# Patient Record
Sex: MALE | Race: OTHER | NOT HISPANIC OR LATINO | URBAN - METROPOLITAN AREA
[De-identification: names, ages, dates, MRNs, and addresses within clinical notes are randomized per-mention and may not be internally consistent; named-entity substitution may affect disease eponyms.]

---

## 2017-09-18 ENCOUNTER — EMERGENCY (EMERGENCY)
Facility: HOSPITAL | Age: 52
LOS: 1 days | Discharge: HOME | End: 2017-09-18
Admitting: EMERGENCY MEDICINE
Payer: MEDICAID

## 2017-09-18 VITALS
OXYGEN SATURATION: 100 % | RESPIRATION RATE: 16 BRPM | DIASTOLIC BLOOD PRESSURE: 70 MMHG | SYSTOLIC BLOOD PRESSURE: 118 MMHG | HEART RATE: 60 BPM

## 2017-09-18 VITALS
TEMPERATURE: 98 F | OXYGEN SATURATION: 100 % | HEART RATE: 62 BPM | RESPIRATION RATE: 18 BRPM | SYSTOLIC BLOOD PRESSURE: 118 MMHG | DIASTOLIC BLOOD PRESSURE: 57 MMHG

## 2017-09-18 PROCEDURE — 73140 X-RAY EXAM OF FINGER(S): CPT | Mod: 26,RT

## 2017-09-18 PROCEDURE — 10120 INC&RMVL FB SUBQ TISS SMPL: CPT

## 2017-09-18 PROCEDURE — 99284 EMERGENCY DEPT VISIT MOD MDM: CPT | Mod: 25

## 2017-09-18 RX ORDER — TETANUS TOXOID, REDUCED DIPHTHERIA TOXOID AND ACELLULAR PERTUSSIS VACCINE, ADSORBED 5; 2.5; 8; 8; 2.5 [IU]/.5ML; [IU]/.5ML; UG/.5ML; UG/.5ML; UG/.5ML
0.5 SUSPENSION INTRAMUSCULAR ONCE
Qty: 0 | Refills: 0 | Status: COMPLETED | OUTPATIENT
Start: 2017-09-18 | End: 2017-09-18

## 2017-09-18 RX ADMIN — TETANUS TOXOID, REDUCED DIPHTHERIA TOXOID AND ACELLULAR PERTUSSIS VACCINE, ADSORBED 0.5 MILLILITER(S): 5; 2.5; 8; 8; 2.5 SUSPENSION INTRAMUSCULAR at 22:58

## 2017-09-18 RX ADMIN — Medication 300 MILLIGRAM(S): at 20:40

## 2017-09-18 NOTE — ED ADULT NURSE NOTE - OBJECTIVE STATEMENT
53 yo male.  A&Ox4.  States he was pinched in right thumb while fishing by a sharp object.  Thumb is painful and discolored.

## 2017-09-18 NOTE — ED PROVIDER NOTE - OBJECTIVE STATEMENT
pt 53 yo m no pmhx c/o pain right 1st digit. pt was fishing 3 weeks ago and cut his finger on a fish and thinks he may have something still in his finger. pain is worsening and hurts when he touches it.   denies fever, chills, sob, cp, n/v/d, pt 53 yo m no pmhx c/o pain right 1st digit. pt was fishing 3 weeks ago and cut his finger on a fish and thinks he may have something still in his finger. pain is worsening and hurts when he touches it. +salt water fish  denies fever, chills, sob, cp, n/v/d, abd pain

## 2017-09-18 NOTE — ED PROVIDER NOTE - PLAN OF CARE
Follow up with Dr. Watkins on Friday. Call [phone] to schedule a time.  Take your antibiotics as prescribed.   Take motrin 600mg every 6 hours for pain as needed.  Any worsening of symptoms or new concerning symptoms return to the ED

## 2017-09-18 NOTE — ED ADULT TRIAGE NOTE - CHIEF COMPLAINT QUOTE
Pt states he was fishing x 3 weeks ago. States he was pinched in right thumb by a sharp object on a fish. States thumb is painful and discolored.

## 2017-09-18 NOTE — ED PROVIDER NOTE - PROGRESS NOTE DETAILS
sidle: hand explored wound. no fb found. 2 sutures placed. will fu in office. will give clinda and doxy

## 2017-09-19 NOTE — CONSULT NOTE ADULT - ASSESSMENT
A/P: 53y/o M, RHD, with right thumb foreign body s/p debridement and removal.  - Right hand elevation/maintain dressing  - Clindamycin  - Tylenol for pain prn  - Tetanus  - F/U 5 days  - Patient educated on warning signs to prompt ER return

## 2017-09-19 NOTE — ED POST DISCHARGE NOTE - REASON FOR FOLLOW-UP
Other Patient called asking that we ERX ABX to different pharmacy since [facility]'t accept [facility]. I ERX to [facility] pharmacy.

## 2017-12-23 ENCOUNTER — EMERGENCY (EMERGENCY)
Facility: HOSPITAL | Age: 52
LOS: 1 days | Discharge: HOME | End: 2017-12-23
Attending: EMERGENCY MEDICINE | Admitting: EMERGENCY MEDICINE
Payer: MEDICAID

## 2017-12-23 VITALS
HEART RATE: 82 BPM | RESPIRATION RATE: 16 BRPM | DIASTOLIC BLOOD PRESSURE: 71 MMHG | OXYGEN SATURATION: 100 % | SYSTOLIC BLOOD PRESSURE: 138 MMHG | TEMPERATURE: 98 F

## 2017-12-23 PROCEDURE — 73140 X-RAY EXAM OF FINGER(S): CPT | Mod: 26,RT

## 2017-12-23 PROCEDURE — 99284 EMERGENCY DEPT VISIT MOD MDM: CPT

## 2017-12-23 NOTE — ED PROVIDER NOTE - OBJECTIVE STATEMENT
53 y/o M with no significant PMhx, reports that 3 months ago he was cleaning fish tank and felt something puncture the pulp region of his right thumb. Pt came to ED, had XR performed, resulting in questionable foreign body. Pt's thumb was explored, however nothing was found and was DC'd home with abx and f/u with hand surgeon. Pt reports that 3 weeks ago, a throbbing pain in that region returned with increased scar tissue formation. Pt returned to hand surgeon, who referred him to ED for XR. said to come to ED for XR. Pt also c/o back pain in the b/l lumbar region and was told he had lumbar strain. Denies any other complaints.

## 2017-12-23 NOTE — ED PROVIDER NOTE - PROGRESS NOTE DETAILS
Muniz: xr of right thumb shows linear opacity foreign body? which is less prominent compare to old one.  dx right thumb pain.  f/u with hand surgeon.  left ambulatory.  return precautions given.

## 2017-12-23 NOTE — ED ADULT TRIAGE NOTE - CHIEF COMPLAINT QUOTE
Pt c/o R thumb pain x 3 mo with bump noted to thumb print area. States he's been seen for it before & told to come in for f/u xray. Also c/o back pain x 2 mo sp heavy lifting. Denies all other adverse symptoms at this time.

## 2019-12-16 NOTE — ED ADULT NURSE NOTE - PT NEEDS ASSIST
"-- DO NOT REPLY / DO NOT REPLY ALL --  -- Message is from the [employer]--    General Patient Message      Reason for Call: Patient stated provider told him to contact him if they dined his medication(vacepa) so that the provider could start an appeal. Please contact      Caller Information       Type Contact Phone    12/16/2019 02:04 PM Phone (Incoming) Yamilka Chung (Self) [phone] (M)          Alternative phone number: none    Turnaround time given to caller: ""This message will be sent to [facility] Provider's name]. The clinical team will fulfill your request as soon as they review your message. \""}    " no

## 2021-07-15 NOTE — CONSULT NOTE ADULT - SUBJECTIVE AND OBJECTIVE BOX
52 year old male, RHD, presents with right thumb pain.  Patient reports fishing 2 weeks ago with "fish scales in my finger."  Patient reports pain with discharge.  Patient denies weakness or numbness in the digit.    PMhx/PSHx: Denies  All: PCN  SHx: NO toxic habits    AVSS  Right thumb:  2mm radial wound along paronychial edge in distal phalanx with necrotic tissue with palpable nodule adjacent to wound with tenderness.  +FPL/+EPL/+OP.  Intact sensation in UDN/RDN to digit.  Cap refill <3s.      Xray Right Thumb: Linear opacity in the soft tissues of the distal 1st digit likely represent foreign body.    Procedure:  Right thumb digital block/dorsal radial block.  Excisional debridement skin to subcutaneous tissue [id number].  Skin flap undermined, removal of foreign body debris.  Wound irrigated with betadine. Skin advanced and approximated loosely with 5.0 nylon sutures.  Antibiotic dressing applied with splint.
None

## 2022-11-04 NOTE — ED ADULT NURSE NOTE - CHIEF COMPLAINT
The patient is a 52y Male complaining of Hypercholesterolemia Monitoring: I explained this is common when taking isotretinoin. We will monitor closely.